# Patient Record
Sex: MALE | Race: WHITE
[De-identification: names, ages, dates, MRNs, and addresses within clinical notes are randomized per-mention and may not be internally consistent; named-entity substitution may affect disease eponyms.]

---

## 2019-10-04 ENCOUNTER — HOSPITAL ENCOUNTER (OUTPATIENT)
Dept: HOSPITAL 96 - M.LAB | Age: 49
End: 2019-10-04
Attending: NURSE PRACTITIONER
Payer: COMMERCIAL

## 2019-10-04 DIAGNOSIS — E78.2: ICD-10-CM

## 2019-10-04 DIAGNOSIS — I25.10: ICD-10-CM

## 2019-10-04 DIAGNOSIS — Z13.29: Primary | ICD-10-CM

## 2019-10-04 LAB
ABSOLUTE BASOPHILS: 0.1 THOU/UL (ref 0–0.2)
ABSOLUTE EOSINOPHILS: 0.3 THOU/UL (ref 0–0.7)
ABSOLUTE MONOCYTES: 0.6 THOU/UL (ref 0–1.2)
BASOPHILS NFR BLD AUTO: 0.8 %
CHOLEST SERPL-MCNC: 111 MG/DL (ref ?–200)
EOSINOPHIL NFR BLD: 4.3 %
GRANULOCYTES NFR BLD MANUAL: 50.7 %
HCT VFR BLD CALC: 44.7 % (ref 42–52)
HDLC SERPL-MCNC: 35 MG/DL (ref 40–?)
HGB BLD-MCNC: 14.7 GM/DL (ref 14–18)
LDLC SERPL-MCNC: 38 MG/DL (ref ?–100)
LYMPHOCYTES # BLD: 2.5 THOU/UL (ref 0.8–5.3)
LYMPHOCYTES NFR BLD AUTO: 36.1 %
MAGNESIUM SERPL-MCNC: 2 MG/DL (ref 1.8–2.4)
MCH RBC QN AUTO: 28.2 PG (ref 26–34)
MCHC RBC AUTO-ENTMCNC: 32.9 G/DL (ref 28–37)
MCV RBC: 85.7 FL (ref 80–100)
MONOCYTES NFR BLD: 8.1 %
MPV: 6.8 FL. (ref 7.2–11.1)
NEUTROPHILS # BLD: 3.5 THOU/UL (ref 1.6–8.1)
NUCLEATED RBCS: 0 /100WBC
PLATELET COUNT*: 381 THOU/UL (ref 150–400)
RBC # BLD AUTO: 5.21 MIL/UL (ref 4.5–6)
RDW-CV: 14.4 % (ref 10.5–14.5)
TC:HDL: 3.2 RATIO
TRIGL SERPL-MCNC: 194 MG/DL (ref ?–150)
VLDLC SERPL CALC-MCNC: 39 MG/DL (ref ?–40)
WBC # BLD AUTO: 6.8 THOU/UL (ref 4–11)

## 2019-10-14 ENCOUNTER — HOSPITAL ENCOUNTER (OUTPATIENT)
Dept: HOSPITAL 96 - M.NUC | Age: 49
End: 2019-10-14
Attending: INTERNAL MEDICINE
Payer: COMMERCIAL

## 2019-10-14 DIAGNOSIS — E78.5: ICD-10-CM

## 2019-10-14 DIAGNOSIS — I25.10: Primary | ICD-10-CM

## 2019-10-14 DIAGNOSIS — I25.2: ICD-10-CM

## 2019-10-14 DIAGNOSIS — I10: ICD-10-CM

## 2019-10-15 NOTE — CARDNUC
[place] [address]
Phone:  [phone]                     CARDIAC NUCLEAR IMAGING REPORT
_______________________________________________________________________________
 
Name:         JESENIA NICHOLSON EMERYPARRISH      Room:                     REG CLI
M.R.#:    [id number]     Account #:     [id number]  
Admission:    10/14/19    Attend Phys:   Bill Callahan,
Discharge:                Date of Birth: [birth year]  
Date of Service: 10/15/19 0837  Report #:      [id number]
        [id number] 
_______________________________________________________________________________
THIS REPORT FOR:  //name//                      
 
 
--------------- APPROVED REPORT --------------
 
 
Study performed:  10/14/2019 09:47:12
 
Exam: Nuclear Stress Test
Indication: follow up
Patient Location: Out-Patient
Stress Tech: Milady Kenney
Stress Nurse: Kinjal Heller RN
NM Tech:SARAH Macdonald
 
Ht: 5 ft 11 in  Wt: 176 lbs  BSA:  2.00 m2
    BMI:  24.54
 
Medical History
Medical History: CAD s/p MI, HTN, Hyperlipidemia
Medications: repatha, asa-81
Allergies: No known drug allergies
Cardiac Risk Factors: Age, HTN, Hyperlipidemia, FHX of CAD
Previous Cardiac Procedures: pci
Exercise History: Physically active
 
Stress Test Details
Stress Test:  Exercise stress testing was performed using a Raoul 
protocol.      
HR           
Resting HR:            62 bpm   Max Heart Rate (APMHR): 169 bpm  
Max HR Achieved:  148 bpm   Target HR (85% APMHR): 143 bpm  
% of APMHR:         87         
Recovery HR:            89 bpm        
HR response to stress: Normal HR response to stress      
 
BP           
Resting BP:  132/92 mmHg        
Max BP:       187/84 mmHg        
 
BP response to stress: Normal blood pressure response to 
stress.      
ECG           
Resting ECG:  Sinus Rhythm        
Stress ECG:     Sinus Tachycardia       
ST Change: None          
 
 
[address] [address]
Phone:  [phone]                     CARDIAC NUCLEAR IMAGING REPORT
_______________________________________________________________________________
 
Name:         JESENIA NICHOLSON      Room:                     REG CLI
M.R.#:    [id number]     Account #:     [id number]  
Admission:    10/14/19    Attend Phys:   Bill Callahan,
Discharge:                Date of Birth: [birth year]  
Date of Service: 10/15/19 0837  Report #:      [id number]
        [id number] 
_______________________________________________________________________________
Arrhythmia:    None         
Recovery ECG: Sinus Rhythm        
Recovery ST Change: None        
Recovery Arrhythmia: None        
 
Clinical
Reason for Termination: Fatigue
Exercise duration: 14 min  sec
Overall Exercise Capacity for Age: Superior
The patient tolerated exercised per standard Raoul protocol without 
significant cardiac symptoms.
 
Stress ECG Conclusion
The baseline 12-lead EKG show sinus rhythm without significant ST or 
T wave abnormality. EKGs obtained during and post exercise showed 
sinus rhythm and sinus tachycardia with no significant ST or T wave 
changes when compared to baseline.
 
NM EXAM: Myocardial Perfusion REST/STRESS
Imaging Protocol: Rest Tc-99m/Stress Tc-99m 1 day
 
Resting Data
Rest SPECT myocardial perfusion imaging was performed in supine 
position 30 minutes following the intravenous injection of 11.1 mCi 
of Tc-99m Sestamibi.
Time of rest injection: 0755     Date: 10/14/2019
The images were gated to evaluate regional wall motion and calculate 
left ventricular ejection fraction. 
Administration Route: IV
Administration Site: Right Hand
 
Exercise Stress
At peak stress, the patient was injected intravenously with 35.7mCi 
of Tc-99m Sestamibi.
Time of stress injection: 1000     Date: 10/14/2019
Administration Route: IV
Administration Site: Right Hand
Gated Stress SPECT was performed 30 minutes after stress 
injection.
The images were gated to evaluate regional wall motion and calculate 
left ventricular ejection fraction. 
Prone imaging was performed.
 
Study Quality
Study: Good
Artifact: No artifact 
 
 
[place] [address]
Phone:  [phone]                     CARDIAC NUCLEAR IMAGING REPORT
_______________________________________________________________________________
 
Name:         JESENIA NICHOLSON      Room:                     REG CLI
M.R.#:    [id number]     Account #:     [id number]  
Admission:    10/14/19    Attend Phys:   Bill Callahan,
Discharge:                Date of Birth: [birth year]  
Date of Service: 10/15/19 0837  Report #:      [id number]
        [id number] 
_______________________________________________________________________________
 
Study Data
At rest, the left ventricular ejection fraction was 67%..   
Post stress, the left ventricular ejection was 64%..   
TID = 0.86.       
 
Perfusion
Myocardial perfusion images show no defect to suggest infarct or 
ischemia.
 
Wall Motion
Normal left ventricular wall motion.
 
Nuclear Conclusion
ECG Findings: negative for ischemia
Clinical Findings: negative for ischemia
Nuclear Findings: negative for ischemia
Exercise Capacity: normal
Left Ventricular Function: normal
Risk Study: low
Myocardial perfusion images show no defect to suggest infarct or 
ischemia. The patient exhibited excellent exercise tolerance. On 
gated studies LV systolic function is normal. This is a low risk 
study. 
 
<Conclusion>
The baseline 12-lead EKG show sinus rhythm without significant ST or 
T wave abnormality. EKGs obtained during and post exercise showed 
sinus rhythm and sinus tachycardia with no significant ST or T wave 
changes when compared to baseline.
 
 
 
 
 
 
 
 
 
 
 
 
 
 
  <ELECTRONICALLY SIGNED>
                                           By: Bill Callahan MD, FACC   
  10/15/19     0837
D: 10/15/19 0837   _____________________________________
T: 10/15/19 0837   Bill Callahan MD, FACC     /INF

## 2019-10-23 ENCOUNTER — HOSPITAL ENCOUNTER (OUTPATIENT)
Dept: HOSPITAL 96 - M.LAB | Age: 49
End: 2019-10-23
Attending: NURSE PRACTITIONER
Payer: COMMERCIAL

## 2019-10-23 DIAGNOSIS — I25.10: Primary | ICD-10-CM

## 2019-10-23 LAB
ANION GAP SERPL CALC-SCNC: 7 MMOL/L (ref 7–16)
BUN SERPL-MCNC: 21 MG/DL (ref 7–18)
CALCIUM SERPL-MCNC: 10.1 MG/DL (ref 8.5–10.1)
CHLORIDE SERPL-SCNC: 104 MMOL/L (ref 98–107)
CO2 SERPL-SCNC: 30 MMOL/L (ref 21–32)
CREAT SERPL-MCNC: 1.1 MG/DL (ref 0.6–1.3)
GLUCOSE SERPL-MCNC: 106 MG/DL (ref 70–99)
POTASSIUM SERPL-SCNC: 4.7 MMOL/L (ref 3.5–5.1)
SODIUM SERPL-SCNC: 141 MMOL/L (ref 136–145)

## 2020-06-19 ENCOUNTER — HOSPITAL ENCOUNTER (OUTPATIENT)
Dept: HOSPITAL 96 - M.LAB | Age: 50
End: 2020-06-19
Attending: INTERNAL MEDICINE
Payer: COMMERCIAL

## 2020-06-19 DIAGNOSIS — E78.1: Primary | ICD-10-CM

## 2020-06-19 LAB
CHOLEST SERPL-MCNC: 117 MG/DL (ref ?–200)
HDLC SERPL-MCNC: 32 MG/DL (ref 40–?)
LDLC SERPL-MCNC: 47 MG/DL (ref ?–100)
TC:HDL: 3.7 RATIO
TRIGL SERPL-MCNC: 191 MG/DL (ref ?–150)
VLDLC SERPL CALC-MCNC: 38 MG/DL (ref ?–40)